# Patient Record
Sex: FEMALE | Race: WHITE
[De-identification: names, ages, dates, MRNs, and addresses within clinical notes are randomized per-mention and may not be internally consistent; named-entity substitution may affect disease eponyms.]

---

## 2021-03-18 VITALS — DIASTOLIC BLOOD PRESSURE: 80 MMHG | SYSTOLIC BLOOD PRESSURE: 127 MMHG

## 2021-05-26 ENCOUNTER — HOSPITAL ENCOUNTER (OUTPATIENT)
Dept: HOSPITAL 63 - RAD | Age: 46
End: 2021-05-26
Attending: FAMILY MEDICINE
Payer: COMMERCIAL

## 2021-05-26 DIAGNOSIS — M54.12: Primary | ICD-10-CM

## 2021-05-26 PROCEDURE — 72050 X-RAY EXAM NECK SPINE 4/5VWS: CPT

## 2021-05-26 NOTE — RAD
EXAM: Cervical spine, 4 views.



HISTORY: Pain.



COMPARISON: None.



FINDINGS: Frontal, lateral, flexion and extension views of the cervical spine are obtained. There is 
instrumented intraspinal fusion and interbody fusion at C5-C6. There is no motion at the fused levels
 between action and extension. There is mild endplate remodeling primarily at C4-C5. There are tiny i
ncidental cervical ribs.



IMPRESSION: 

1. Instrumented fusion at C5-C6. There is no evidence of instrumentation loosening.

2. Multilevel endplate remodeling, primarily at C4-C5.

3. Tiny incidental cervical ribs.



Electronically signed by: Molly Tracy MD (5/26/2021 12:49 PM) [id number]